# Patient Record
Sex: FEMALE | Race: BLACK OR AFRICAN AMERICAN | NOT HISPANIC OR LATINO | URBAN - METROPOLITAN AREA
[De-identification: names, ages, dates, MRNs, and addresses within clinical notes are randomized per-mention and may not be internally consistent; named-entity substitution may affect disease eponyms.]

---

## 2021-11-06 ENCOUNTER — EMERGENCY (EMERGENCY)
Facility: HOSPITAL | Age: 56
LOS: 1 days | Discharge: HOME | End: 2021-11-06
Attending: EMERGENCY MEDICINE | Admitting: EMERGENCY MEDICINE
Payer: MEDICAID

## 2021-11-06 VITALS
DIASTOLIC BLOOD PRESSURE: 60 MMHG | OXYGEN SATURATION: 100 % | SYSTOLIC BLOOD PRESSURE: 130 MMHG | TEMPERATURE: 97 F | RESPIRATION RATE: 19 BRPM | HEART RATE: 87 BPM

## 2021-11-06 PROCEDURE — 99284 EMERGENCY DEPT VISIT MOD MDM: CPT

## 2021-11-06 NOTE — ED ADULT TRIAGE NOTE - CHIEF COMPLAINT QUOTE
Pt st" On Sat I started with headache but not dizzy...on Sunday I got up from bed maybe dizzy then fell back on to the bed kicked dresser fracture toe...seen at [facility] did not hit head did not black out ever since last weekend I feel a dizziness, and pain that travels down back of spine ..dizziness comes and goes...comes with different movements....I also have pain in throat and left ear, upper jaw."

## 2021-11-07 VITALS
TEMPERATURE: 98 F | OXYGEN SATURATION: 100 % | DIASTOLIC BLOOD PRESSURE: 73 MMHG | RESPIRATION RATE: 17 BRPM | HEART RATE: 61 BPM | SYSTOLIC BLOOD PRESSURE: 123 MMHG

## 2021-11-07 RX ORDER — DIPHENHYDRAMINE HCL 50 MG
25 CAPSULE ORAL ONCE
Refills: 0 | Status: COMPLETED | OUTPATIENT
Start: 2021-11-07 | End: 2021-11-07

## 2021-11-07 RX ORDER — METOCLOPRAMIDE HCL 10 MG
10 TABLET ORAL ONCE
Refills: 0 | Status: COMPLETED | OUTPATIENT
Start: 2021-11-07 | End: 2021-11-07

## 2021-11-07 RX ORDER — KETOROLAC TROMETHAMINE 30 MG/ML
30 SYRINGE (ML) INJECTION ONCE
Refills: 0 | Status: DISCONTINUED | OUTPATIENT
Start: 2021-11-07 | End: 2021-11-07

## 2021-11-07 RX ADMIN — Medication 10 MILLIGRAM(S): at 01:41

## 2021-11-07 RX ADMIN — Medication 25 MILLIGRAM(S): at 03:59

## 2021-11-07 RX ADMIN — Medication 30 MILLIGRAM(S): at 01:00

## 2021-11-07 NOTE — ED ADULT NURSE NOTE - OBJECTIVE STATEMENT
Covering RN note: Pt resting quielty calm pleasant affect, Pt c/o dizziness with pain in left ear, left side of face, also c/o pain back of head and neck. Pt st" I got up from bed last weekend and lost my balance fallinginto a dresser hurting my toe and then fell back on to bed. Did not black out or hit head...but ever since I am very dizzy especially on certain movement." Pt with left foot in boot Covering RN note: Pt resting quielty calm pleasant affect, Pt c/o dizziness with pain in left ear, left side of face, also c/o pain back of head and neck. Pt st" I got up from bed last weekend and lost my balance fallinginto a dresser hurting my toe and then fell back on to bed. Did not black out or hit head...but ever since I am very dizzy especially on certain movement...I went to [facility] last weekend and told I have toe fracture. But this dizziness and the left ear , pain also neck and left side of jaw/face and throat hurts." Pt with left foot in boot in place. Resident at bedside. call bell in reach. Pt instructed to use call bell for asst. Fall risk. Pt verbaliizing understanding. further orders to follow.

## 2021-11-07 NOTE — ED ADULT NURSE NOTE - CHIEF COMPLAINT QUOTE
Pt st" On Sat I started with headache but not dizzy...on Sunday I got up from bed maybe dizzy then fell back on to the bed kicked dresser fracture toe...seen at [facility] did not hit head did not black out ever since last weekend I feel a dizziness, and pain that travels down back of spine ..dizziness comes and goes...comes with different movements....I also have pain in throat and left ear, upper jaw."

## 2021-11-07 NOTE — ED PROVIDER NOTE - NS ED ROS FT
GENERAL: No fever, no chills  EYES: +blurry vision   HEENT: No trouble swallowing or speaking  CARDIAC: No chest pain  PULMONARY: No cough, no SOB  GI: No abdominal pain, no nausea or no vomiting, no diarrhea, no constipation  : No changes in urination  SKIN: No rashes  NEURO: +headache, no numbness  MSK: No joint pain  Otherwise as HPI or negative.

## 2021-11-07 NOTE — ED PROVIDER NOTE - NSFOLLOWUPCLINICS_GEN_ALL_ED_FT
[facility] ENT  ENT  [address], [address]  [place] [address]  Phone: [phone]  Fax:

## 2021-11-07 NOTE — ED PROVIDER NOTE - ATTENDING CONTRIBUTION TO CARE
Seen and examined, pt. has neck c/o x many  months, but were mild and improved with physical therapy, then had near fall on Sun. night and injured L great toe. Since then has room spinning dizziness, johnathon. with head movements, and nausea but no vomiting. Similar c/o in 2014, was told poss. vertigo, Rx meclizine but states made her worse so she stopped taking. Pt. yest. dev. inc. L ear pain and pressure to L face. No fever/chills, no vision change, states L upper teeth sensitivity. ALEXI, EOMI, TM clear, neck supple, full ROM, NT R sinus, mild tender L frontal and maxillary, normal gums, mild tender L maxillary premolar, clear lungs, heart reg, abd soft, NT to palp

## 2021-11-07 NOTE — ED PROVIDER NOTE - NSFOLLOWUPINSTRUCTIONS_ED_ALL_ED_FT
Your bloodwork did not show any unusual findings.  Your dizziness was reproducible with head movements and also caused shaky eye movements to appear (nystagmus).  You likely have vertigo related to a nerve in the inner ear which controls your sense of balance. Motion sickness is also related to problems with this area.  Most peripheral vertigo symptoms improve over 7 to 10 days. They are typically worsened with movements.  You should avoid driving and sudden movements, but do not remain perfectly still. Slow gradual movements are preferred to bedrest.  You can use over the counter antihistamines for worsened symptoms, or fill phenergan prescription.  Ibuprofen (Motrin or Advil) 400mg up to 3x per day, take with food.  Follow up with your doctor or ENT.  Return to Emergency for worsening pain, fever, weakness, numbness, vomiting, falls, inability to walk or any signs of distress. Your bloodwork did not show any unusual findings.  Your dizziness was reproducible with head movements and also caused shaky eye movements to appear (nystagmus).  You likely have vertigo related to a nerve in the inner ear which controls your sense of balance. Motion sickness is also related to problems with this area.  Most peripheral vertigo symptoms improve over 7 to 10 days. They are typically worsened with movements.  You should avoid driving and sudden movements, but do not remain perfectly still. Slow gradual movements are preferred to bedrest.  You can use over the counter antihistamines for worsened symptoms, or fill phenergan prescription.  Ibuprofen (Motrin or Advil) 400mg up to 3x per day, take with food.  Follow up with your doctor or ENT. The contact information for the [facility] is also included in your discharge paperwork.   Return to Emergency for worsening pain, fever, weakness, numbness, vomiting, falls, inability to walk or any signs of distress.

## 2021-11-07 NOTE — ED PROVIDER NOTE - PHYSICAL EXAMINATION
Gen: NAD, AOx3, able to make needs known, non-toxic  Head: NCAT  HEENT: pupils equal and reactive to light, EOMI, oral mucosa moist, normal conjunctiva  Lung: CTAB, no respiratory distress, no wheezes/rhonchi/rales B/L, speaking in full sentences  CV: RRR, no M/R/G, pulses bilaterally   Abd: soft, NTND, no guarding, no CVA tenderness   MSK: no visible deformities  Neuro: No focal sensory or motor deficits, positive Pravin-Hallpike maneuver performed with head facing the left resulted in significant horizontal nystagmus and reproduction of sx which improved after ~20 seconds.  Skin: Warm, well perfused, no rash  Psych: normal affect

## 2021-11-07 NOTE — ED PROVIDER NOTE - OBJECTIVE STATEMENT
57yo woman presenting with dizziness since Monday. Tuesday morning she awoke with more severe dizziness, described as room spinning. Provoked with head turning. Patient has hx of headaches, states posterior headache has gotten worse since Tuesday, radiating down her back. Also complaining of tinnitus and stabbing sensation along her lower left face and left throat. Endorses nausea. Patient denies fever, 55yo woman presenting with dizziness since Monday. Tuesday morning she awoke with more severe dizziness, described as room spinning. Provoked with head turning. Patient has hx of headaches, states posterior headache has gotten worse since Tuesday, radiating down her back. Also complaining of tinnitus and stabbing sensation along her lower left face and left throat. Endorses nausea, intermittent blurry vision. Patient denies fever, trauma, vomiting, trouble swallowing.

## 2021-11-07 NOTE — ED PROVIDER NOTE - CLINICAL SUMMARY MEDICAL DECISION MAKING FREE TEXT BOX
Nehemias, PGY1 - 57yo woman with worsening vertigo, positive Pravin-Hallpike, reproducible symptoms, resolves within 30 seconds, no vertical nystagmus, high suspicion for BPPV. No trauma, no anticoagulation, decreased suspicion for central vertigo. Socorro, ekg, reassess. Nehemias, PGY1 - 55yo woman with worsening vertigo, positive Pravin-Hallpike, reproducible symptoms, resolves within 30 seconds, no vertical nystagmus, + L ear c/o, high suspicion for BPPV. No trauma, no anticoagulation, decreased suspicion for central vertigo. Relgan, ekg, reassess.

## 2021-11-07 NOTE — ED PROVIDER NOTE - PATIENT PORTAL LINK FT
You can access the FollowMyHealth Patient Portal offered by [facility] by registering at the following website: [email or web address]. By joining [facility]’s FollowMyHealth portal, you will also be able to view your health information using other applications (apps) compatible with our system.

## 2021-11-11 PROBLEM — Z78.9 OTHER SPECIFIED HEALTH STATUS: Chronic | Status: ACTIVE | Noted: 2021-11-07

## 2021-11-16 PROBLEM — Z00.00 ENCOUNTER FOR PREVENTIVE HEALTH EXAMINATION: Status: ACTIVE | Noted: 2021-11-16

## 2021-11-29 ENCOUNTER — APPOINTMENT (OUTPATIENT)
Dept: OTOLARYNGOLOGY | Facility: CLINIC | Age: 56
End: 2021-11-29

## 2023-04-11 NOTE — ED PROVIDER NOTE - PROGRESS NOTE DETAILS
Thank you for allowing me to be part of your healthcare team at [facility]. It is a pleasure to care for you today.   Please take all of your medications as instructed and follow all new instructions from your visit today.  If you received labs or medical tests today you should hear information about results or scheduling either by phone or mychart within approximately a week.   If you have any questions or concerns please do not hesitate to call. Have a blessed day and I hope to see you again soon.  Dr. Jannette Mackey  
Nehemias, PGY1 - Patient reports improved symptoms. Patient stable for discharge. Understands the Emergency Room work-up and discharge precautions.

## 2023-10-27 NOTE — ED PROVIDER NOTE - CCCP TRG CHIEF CMPLNT
This visit is being performed virtually via Video visit using [email or web address].   Clinical Location: [facility] [place] [address] Randi Wong's location Home and is physically present in   the [facility] at the time of this visit.     Judith Avina was informed that consent to treat includes permission to submit charges to the applicable insurance on file. Judith Avina was advised regarding the potential risk inherent in video visits, as the assessment may be limited due to what can be seen on the screen which potentially results in an incomplete assessment; as well as either of us may discontinue the video visit if it is felt that the videoconferencing connections are not adequate for his/her situation.     Judith Avina is a 87 year old female. Patient's name and  were verified. Patient confirmed her is currently located in the [facility]. Judith Avina gave verbal consent to continue with this video visit.     Patient's last in-office visit was May 2023.    Patient notes the following changes in medical history since last visit:   had concerns including Video Visit, Medication Management (Discuss OTC iron. Last dose of amitiza Saturday due to diarrhea. -they have call out to prescribing doctor), and Gastro-intestinal Problem (Black stools starting 10/19/23).    Patient offers the following concerns:    Patient recently discharged from the hospital.   Now with black stools. High pulse, low blood pressures.   7-10 loose stools a day, that smell bad per family.     Review of Systems:  See above.     All vitals below are per the patient.   Vitals - Patient Reported  BP - Patient Reported: 121/72  Pulse - Patient Reported: 107  Temp - Patient Reported: 98.3 °F (36.8 °C)  SpO2% - Patient Reported: 97 %  Weight - Patient Reported: 84 lb 8 oz (38.3 kg) (on 10/18/23)  Pain Score:  0    Physical Exam  Vitals and nursing note reviewed.   Constitutional:       General: She is not in  acute distress.     Appearance: Normal appearance. She is well-developed. She is not ill-appearing or toxic-appearing.   HENT:      Head: Normocephalic and atraumatic.      Neck: Normal range of motion.   Cardiovascular:      Rate and Rhythm: Normal rate.   Pulmonary:      Effort: Pulmonary effort is normal. No respiratory distress.   Neurological:      Mental Status: She is alert.         The following testing was reviewed during this video visit: none    Medication and allergy reconciliation completed over during the video visit.     The following problems were addressed during today's video visit and orders placed:  Problem List Items Addressed This Visit        Genitourinary and Reproductive    Acute cystitis with hematuria       Neuro    Aphasia following unspecified cerebrovascular disease   Other Visit Diagnoses     Low hemoglobin    -  Primary    Relevant Orders    CBC with Automated Differential    Iron And total Iron Binding Capacity    Ferritin    Loose stools        Relevant Orders    C Difficile Toxin by PCR    STOOL, BACTERIAL CULTURE        Will get labs and blood studies.   I recommended ER visit because of her vitals and possible infection. Family declined.      Time spent in medical discussion with patient during today's video visit: 20 minutes    Patient was advised to call if they experience any new or worsening symptoms.    Return if symptoms worsen or fail to improve.    Discussed medication dosage, usage, goals of therapy, and side effects.    The patient indicates understanding of these issues and agrees with the plan.    Usman Jim DO [facility]   dizziness, head, neck left ear pain, throat pain/multiple medical complaints